# Patient Record
(demographics unavailable — no encounter records)

---

## 2024-10-08 NOTE — ASSESSMENT
[TextEntry] : 3 weeks ago, the patient had sudden onset of intense vulvar itching.  The itching is primarily in the genital crural folds and just above her panty line.  There is some erythema above the panty line but no other dermatologic manifestations.  She was treated with Mycolog and then Lotrisone without relief.  There is no evidence of Candida (pending culture).  She most likely has contact dermatitis. 46 minutes of total time was spent on the date of the encounter. This included time for review of medical records and laboratory results, face to face time (including the physical examination counseling and coordination of care), time for patient education, treatment plans, answering questions, communicating with other doctors and for medical record documentation.  The time spent is separate from time spent on separately billed procedures.

## 2024-10-08 NOTE — HISTORY OF PRESENT ILLNESS
[FreeTextEntry1] : The patient is 67 years old  2 para 2-0-0-2 whose last menstrual period was in her 50s.  She is referred for evaluation of vulvar itching.  She developed intense vulvar itching 3 weeks ago.  A GYN exam was negative and a vaginitis panel showed no evidence of Candida.  She was treated with Mycolog and developed swelling.  Lotrisone did not help.  The itching persists.  She is using over-the-counter vaginocele for symptomatic relief. ***The patient was asked about all of the following, positive responses are listed below*** Gynecologic History: History of breast biopsy or breast cyst aspiration; Pain during or after intercourse; Vaginal bleeding or spotting between periods; Breast discharge; Abnormal or irregular periods; Abnormal vaginal discharge; history of Gynecologic cancer; history of Ovarian cysts; history of Fibroids; history of frequent Urinary tract infections; Urinary problems (i.e. frequency, urgency, difficulty, leaking); history of Pelvic pain/pelvic inflammatory disease; history of chronic Vaginal infections (i.e., yeast, Trichomonas, bacterial Vaginosis) Contraceptive History: What she is currently using for birth control; If she was requesting birth control today. Sexually Transmitted Disease History: A history of any of the following sexually transmitted diseases: Gonorrhea; Chlamydia; Syphilis; Herpes; HPV/Warts; Hepatitis; HIV/AIDS and whether she feels that she is at risk for HIV/AIDS and wants to be tested for HIV. Abnormal Pap Smear History: History of abnormal Pap smear; evaluation of any abnormalities; treatment of any abnormalities Date and result of her last Pap smear. TALON in Utero Exposure History: A history of personal or maternal TALON exposure. Hospitalizations (other than childbirth) Blood transfusions. Review of Systems: General: weight change, general health; Head: headaches, vertigo; Eyes: vision, diplopia; Ears: change in hearing, tinnitus; Nose: epistaxis, chronic rhinitis; Mouth: gingival bleeding; Neck: stiffness, pain, masses; Chest: dyspnea, wheezing, hemoptysis, persistent cough; Heart: chest pains, palpitations; Abdomen: liver disease, abdominal discomfort, Indigestion, Nausea/Vomiting, Constipation/Diarrhea, Blood in stool, change in bowel habits; : renal disease; Musculoskeletal: pain in muscles or joints, paresthesias or numbness; Skin: rashes, itching, pigmentation changes; Neurologic: weakness, tremor, seizures, changes in mentation; Psychiatric: depressive symptoms, changes in sleep habits. Surgery: History of gynecological surgery; History of non-gynecological surgery.   ***Pregnancy History*** # Pregnancies 2; # deliveries 2; # vaginal 2.   ***Menstrual History*** Age of first period:16; # of days between cycles:28; duration of period: 5 days; she had cramps with periods; used nothing to relieve cramps.   ***Menopause*** Menopause at age 55; The patient was asked about all of the following, positive responses are listed below: menopausal symptoms; past use of hormonal replacement therapy.   ***Sexual history*** She is not sexually active; [place]: 16; Total # of lifetime partners:3; She denies having more than one current partner; She has been with her current partner for 43 years; Date of last sexual contact (if not currently sexually active): .   ***Medical history*** Alcohol abuse; Anemia; Anxiety; Arthritis/Lupus; Asthma; Blood clots in legs; Blood disorders; Breast problems; Cancer; Colitis; Depression; Diabetes; Drug Abuse; Eating disorder; Elevated cholesterol; Epilepsy/seizures; Eye problems/glaucoma; Gall bladder disease; Gout; Head or neck radiation; Hearing problems; Heart Disease; Hemorrhoid; Hepatitis or jaundice; High blood pressure; HIV/AIDS; Kidney disease; Low back problems; Neurological disorders; Osteoporosis; Other; Pneumonia; Rheumatic fever; Skin diseases; Stroke; Thyroid disease; Ulcers Family History - Cancers of the Breast; Cervix; Uterus; Ovarian; Lung; Colon; Other Vaccination Status - Whether she had all the usual childhood vaccinations or illness (negative answers recorded); Whether she was not vaccinated for HPV (All 3 doses) Social History - Current or past cigarette smoking; current or past alcohol abuse; current or past Marijuana use; current or past Cocaine use; current or past abuse of any other illegal or prescription drugs; current employment.   ***Preventative Care*** Date of the patient's last: Pap smear:  Breast exam:  Mammogram:  Cholesterol check:  Sigmoidoscopy/colonoscopy:    ***Social History*** The patient was asked about all of the following; positive responses are listed below: current or past cigarette smoking; Alcohol; current or past Marijuana use; current or past Cocaine use; current or past abuse of any other illegal or prescription drugs. Employment: Retired   ***Significant positives*** Medications that she was taking at the time of the visit include Aliskiren, Atorvastatin, hydrochlorothiazide and potassium. She was not taking any other medication at the time of the visit. She reports allergy to shellfish. She denied any other medication allergies at the time of the visit. Her past gynecological history is not significant. She is not sexually active. She denies a history of abnormal Pap tests. Her last Pap test was  and was normal. Her past medical history is significant for hypertension and elevated cholesterol. Her past surgical history is significant for bunion surgery (2024). Her past medical and surgical histories are not otherwise significant. Besides hospitalizations for surgeries and deliveries, she has not been hospitalized. She was not vaccinated for HPV. A 20-point review of systems was significant for hay fever. Her review of systems was not otherwise significant. Her family's cancer history is not significant. She does not smoke.

## 2024-10-08 NOTE — PLAN
[FreeTextEntry1] : I recommended that she call for the results of her yeast culture in 1 week.  I recommended that she avoid irritants and gave her a list of irritants to avoid.  I told her to try using Vaseline to improve the vulvas barrier function.  I gave her clobetasol 0.05% ointment to use (a pea-sized amount) twice daily for symptomatic relief but she should not use it for more than 2 weeks.  I also recommended that she return for a follow up evaluation in one month.

## 2024-10-08 NOTE — PHYSICAL EXAM
[Chaperone Present] : A chaperone was present in the examining room during all aspects of the physical examination [TextEntry] : ***General*** General Appearance: normal; Judgment and insight: normal; the patient is oriented to time, place and person; Recent and remote memory: normal; Mood and affect: normal; Respiratory effort: normal.   ***Pelvic Examination*** External genitalia: the appearance and hair distribution are normal; no lesions are appreciated. Urethra/urethral meatus: no masses or tenderness are appreciated; there is no scarring noted. Bladder: nontender; there is no fullness appreciated; no masses were palpated. Vagina: the vagina is moderately atrophic; a scanty discharge is seen; no lesions are seen; pelvic support is adequate without any evidence of cystocele or rectocele. Cervix: normal in appearance; no overt lesions are seen. Uterus: Anteverted; normal in size, mobile, nontender, and well supported. Adnexa/parametria: nontender and not enlarged; no masses are palpated. A stool specimen was not indicated at this time.   ***Vaginal Discharge Evaluation*** A saline wet mount and KOH slide evaluation of the vaginal discharge were done. The discharge was scanty; the pH was elevated; the whiff test was negative; clue cells were not seen; hyphae were not seen; no lactobacilli were seen; no white blood cells were seen; parabasal cells were seen; a candida culture was sent.   ***colposcopy of the vulva*** Colposcopy of the external genitalia showed that the labia majora and labia minora were normal. She identified the genital crural folds bilaterally and suprapubic skin above the escutcheon as the location of her pain/itching/irritation. There was no vestibular tenderness of the anterior minor vestibular glands, and no vestibular tenderness of the posterior minor vestibular glands. There was no evidence of other dermatopathology. There was no erythema of the introitus. There was erythema above her scaption. There was moderate vulvar atrophy at the introitus.

## 2024-11-06 NOTE — PHYSICAL EXAM
[Chaperone Present] : A chaperone was present in the examining room during all aspects of the physical examination [TextEntry] : ***General*** General Appearance: normal; Judgment and insight: normal; the patient is oriented to time, place and person; Recent and remote memory: normal; Mood and affect: normal; Respiratory effort: normal.  ***Pelvic Examination*** External genitalia: the appearance and hair distribution are normal; no lesions are appreciated. Urethra/urethral meatus: no masses or tenderness are appreciated; there is no scarring noted. Bladder: nontender; there is no fullness appreciated; no masses were palpated. Vagina: the vagina is moderately atrophic; a scanty discharge is seen; no lesions are seen; pelvic support is adequate without any evidence of cystocele or rectocele. Cervix: normal in appearance; no overt lesions are seen. Uterus: Anteverted; normal in size, mobile, nontender, and well supported. Adnexa/parametria: nontender and not enlarged; no masses are palpated. A stool specimen was not indicated at this time.  ***colposcopy of the vulva*** Colposcopy of the external genitalia showed that the labia majora and labia minora were normal. There was no vestibular tenderness of the anterior minor vestibular glands, and no vestibular tenderness of the posterior minor vestibular glands. There was no evidence of other dermatopathology. There was no erythema of the introitus or anywhere else on her vulva. There was moderate vulvar atrophy at the introitus.

## 2024-11-06 NOTE — PLAN
[FreeTextEntry1] : I recommended that she continue to avoid any irritants that she does not find critical.  Those that she finds critical could be gradually reintroduced 1 at a time and not more frequently than once monthly.  I discouraged the use of clobetasol unless it is absolutely necessary and then not more than 2 weeks at a time.  She should return as needed.

## 2024-11-06 NOTE — HISTORY OF PRESENT ILLNESS
[FreeTextEntry1] : 2 months ago, the patient had sudden onset of intense vulvar itching. The itching is primarily in the genital crural folds and just above her panty line. There is some erythema above the panty line but no other dermatologic manifestations. She was treated with Mycolog and then Lotrisone without relief. There is no evidence of Candida on culture.  She eliminated irritants and was treated with twice weekly clobetasol and her symptoms resolved completely.  She completed the last dose of clobetasol 7 to 10 days ago and remains asymptomatic.

## 2024-11-06 NOTE — ASSESSMENT
[TextEntry] : The patient has a history of vulvar itching that did not respond to mid potency topical steroids but did respond to the elimination of irritants and 2 weeks of twice daily clobetasol ointment. 22 minutes of total time was spent on the date of the encounter. This included time for review of medical records and laboratory results, face to face time (including the physical examination counseling and coordination of care), time for patient education, treatment plans, answering questions, communicating with other doctors and for medical record documentation.  The time spent is separate from time spent on separately billed procedures.